# Patient Record
Sex: FEMALE | Race: WHITE | NOT HISPANIC OR LATINO
[De-identification: names, ages, dates, MRNs, and addresses within clinical notes are randomized per-mention and may not be internally consistent; named-entity substitution may affect disease eponyms.]

---

## 2021-10-01 ENCOUNTER — NON-APPOINTMENT (OUTPATIENT)
Age: 76
End: 2021-10-01

## 2021-10-01 ENCOUNTER — APPOINTMENT (OUTPATIENT)
Dept: OBGYN | Facility: CLINIC | Age: 76
End: 2021-10-01
Payer: MEDICARE

## 2021-10-01 VITALS
DIASTOLIC BLOOD PRESSURE: 90 MMHG | WEIGHT: 137 LBS | SYSTOLIC BLOOD PRESSURE: 160 MMHG | HEIGHT: 65 IN | BODY MASS INDEX: 22.82 KG/M2

## 2021-10-01 DIAGNOSIS — Z78.9 OTHER SPECIFIED HEALTH STATUS: ICD-10-CM

## 2021-10-01 DIAGNOSIS — Z87.42 PERSONAL HISTORY OF OTHER DISEASES OF THE FEMALE GENITAL TRACT: ICD-10-CM

## 2021-10-01 DIAGNOSIS — Z86.79 PERSONAL HISTORY OF OTHER DISEASES OF THE CIRCULATORY SYSTEM: ICD-10-CM

## 2021-10-01 DIAGNOSIS — Z86.018 PERSONAL HISTORY OF OTHER BENIGN NEOPLASM: ICD-10-CM

## 2021-10-01 DIAGNOSIS — Z85.828 PERSONAL HISTORY OF OTHER MALIGNANT NEOPLASM OF SKIN: ICD-10-CM

## 2021-10-01 DIAGNOSIS — Z80.42 FAMILY HISTORY OF MALIGNANT NEOPLASM OF PROSTATE: ICD-10-CM

## 2021-10-01 DIAGNOSIS — Z72.89 OTHER PROBLEMS RELATED TO LIFESTYLE: ICD-10-CM

## 2021-10-01 PROBLEM — Z00.00 ENCOUNTER FOR PREVENTIVE HEALTH EXAMINATION: Status: ACTIVE | Noted: 2021-10-01

## 2021-10-01 PROCEDURE — 99202 OFFICE O/P NEW SF 15 MIN: CPT

## 2021-10-07 ENCOUNTER — APPOINTMENT (OUTPATIENT)
Dept: OBGYN | Facility: CLINIC | Age: 76
End: 2021-10-07
Payer: MEDICARE

## 2021-10-07 VITALS — TEMPERATURE: 97.3 F

## 2021-10-07 VITALS
WEIGHT: 137 LBS | HEIGHT: 65 IN | BODY MASS INDEX: 22.82 KG/M2 | SYSTOLIC BLOOD PRESSURE: 140 MMHG | DIASTOLIC BLOOD PRESSURE: 84 MMHG

## 2021-10-07 LAB — URINE CYTOLOGY: NORMAL

## 2021-10-07 PROCEDURE — 99213 OFFICE O/P EST LOW 20 MIN: CPT

## 2021-10-14 ENCOUNTER — RESULT REVIEW (OUTPATIENT)
Age: 76
End: 2021-10-14

## 2021-10-14 ENCOUNTER — APPOINTMENT (OUTPATIENT)
Dept: UROLOGY | Facility: CLINIC | Age: 76
End: 2021-10-14
Payer: MEDICARE

## 2021-10-14 VITALS
WEIGHT: 137 LBS | SYSTOLIC BLOOD PRESSURE: 154 MMHG | HEIGHT: 65 IN | DIASTOLIC BLOOD PRESSURE: 77 MMHG | BODY MASS INDEX: 22.82 KG/M2 | TEMPERATURE: 97.5 F | HEART RATE: 92 BPM

## 2021-10-14 DIAGNOSIS — R31.0 GROSS HEMATURIA: ICD-10-CM

## 2021-10-14 DIAGNOSIS — R31.29 OTHER MICROSCOPIC HEMATURIA: ICD-10-CM

## 2021-10-14 PROCEDURE — 99205 OFFICE O/P NEW HI 60 MIN: CPT

## 2021-10-14 RX ORDER — ALPRAZOLAM 0.25 MG/1
0.25 TABLET ORAL
Qty: 1 | Refills: 0 | Status: DISCONTINUED | COMMUNITY
Start: 2021-10-01 | End: 2021-10-14

## 2021-10-14 NOTE — HISTORY OF PRESENT ILLNESS
[FreeTextEntry1] : Chief Complaint: + urine cytology / +++UA blood\par \par History of Present Illness:\par \par 76 female w/ PMH of HTN, fibroids, who presents with consultation for gross/microscopic hematuria. First discovered - Recently experienced a first week of PMB: postmenopausal bleeding. Currently worked up by Dr Lisa for PMB / has a leiomyoma on US pelvis.\par \par UA on 09/29/2021w/ Microscopic confirmation of LARGE blood\par Has seen bloody urine with the naked eye.\par Denies passage of clots.\par currently pad dependant.\par Associated symptoms include none, no dysuria.\par \par Endorses fully emptying bladder with urination, without history of retention.\par \par Past urologic history includes: \par Smoker for 10 years x 1 packs per day.\par No prior history to radiation/cyclophosphamide.\par No history of recurrent UTI or chronic catheterization\par No family history of  malignancies.\par Denies recent trauma to flanks or abdomen or urethral instrumentation.\par \par Denies personal and family history of kidney stones and is not experiencing intermittent sharp flank pain radiating to groin. Denies history of blood clots.\par Denies STD history.\par Does not take anticoagulants or Aspirin.\par Denies physical activity prior to associated hematuria.\par Pertinent review of systems is negative for: Lightheadedness, Dizziness, SOB, CP, Palpitations, Fever, Chills, sweats, weight loss - DOES endorse lower back pain.\par

## 2021-10-14 NOTE — REVIEW OF SYSTEMS
[Blood in urine that you can see] : blood visible in urine [Told you have blood in urine on a urine test] : told blood was present in a urine test [Wake up at night to urinate  How many times?  ___] : wakes up to urinate [unfilled] times during the night [Leakage of urine with straining, coughing, laughing] : leakage of urine with straining, coughing, laughing [Urine Infection (bladder/kidney)] : denies bladder/kidney infections [Pain during urination] : denies pain during urination [Pain at onset of urination] : denies pain during onset of urination [Pain after urination] : denies pain after urination [Discharge from urine canal] : denies discharge from urine canal [History of kidney stones] : denies history of kidney stones [Urine retention] : denies urine retention [Strong urge to urinate] : denies strong urge to urinate [Bladder pressure] : denies bladder pressure [Strain or push to urinate] : denies straining or pushing to urinate [Wait a long time to urinate] : denies waiting a long time to urinate [Slow urine stream] : denies slow urine stream [Interrupted urine stream] : denies interrupted urine stream [Bladder fullness after urinating] : denies bladder fullness after urinating [Increased pain/discomfort with bladder filling] : denies increased pain/discomfort with bladder filling [Bladder problems as child. If yes, describe..] : denies bladder problems as child [Unaware of when urine is leaking] : aware of when urine is leaking

## 2021-10-14 NOTE — PHYSICAL EXAM
[General Appearance - Well Developed] : well developed [General Appearance - Well Nourished] : well nourished [Heart Rate And Rhythm] : Heart rate and rhythm were normal [] : no respiratory distress [Bowel Sounds] : normal bowel sounds [Urinary Bladder Findings] : the bladder was normal on palpation [No Focal Deficits] : no focal deficits [Oriented To Time, Place, And Person] : oriented to person, place, and time [Not Anxious] : not anxious

## 2021-10-14 NOTE — ASSESSMENT
[FreeTextEntry1] : Assessment:\par \par 76 female with gross & microscopic hematuria \par UA from  ER visit demonstrates: large blood / urine cytology:suspicious for malignant cells\par Cannot exclude malignant etiology and thus requires complete hematuria workup with CT Urogram and Cystoscopy. Does have a history of smoking.\par Positive family history for  malignancy: prostate cancer in father\par \par Plan:\par Schedule patient for Cystoscopy + bladder biopsies + possible TUR + bilateral retrograde pyelograms\par CT urogram WWO to assess the upper urinary tract.\par \par 3) Labs\par \par BMP - Order placed for BMP to assess kidney function prior to contrast dye\par \par

## 2021-10-14 NOTE — LETTER BODY
[Dear  ___] : Dear  [unfilled], [Consult Letter:] : I had the pleasure of evaluating your patient, [unfilled]. [Please see my note below.] : Please see my note below. [Consult Closing:] : Thank you very much for allowing me to participate in the care of this patient.  If you have any questions, please do not hesitate to contact me. [Sincerely,] : Sincerely, [FreeTextEntry1] : I had the pleasure of seeing She in Urology clinic today. I have attached the relevant portions of our clinic note from the visit that documents the assessment and plan. I appreciate the opportunity to participate in her care. Should you have any questions, please do not hesitate to contact me.  \par \par Email: [email or web address] \par \par Mobile: [phone] \par  \par \par \par  [FreeTextEntry3] : Rafa Goldberg MD\par Urologic Oncology\par Robotic & Endoscopic urology\par [facility] [place]\par [facility]\par

## 2021-10-15 ENCOUNTER — NON-APPOINTMENT (OUTPATIENT)
Age: 76
End: 2021-10-15

## 2021-10-15 LAB — HPV HIGH+LOW RISK DNA PNL CVX: NOT DETECTED

## 2021-10-15 NOTE — DISCUSSION/SUMMARY
[FreeTextEntry1] : 75yo  PMF not on HRT is here for evaluation of PMB with 5cm anterior/fundal mass on recent sono concerning for malignancy.  Pt intolerant of exam today and in need of pap and endo bx therefore she was given Xanax to take prior to next visit so that exam can be performed. Pt is aware that she cannot drive herself to or from this visit since she will be rx'd this medicine.  \par \par Given SPP complaint and bleeding will also send urine cyto today to r/o bladder involvement, \par \par Pt to [facility] next week for exam, Pap and endo bx. \par \par All questions were solicited and answerd \par \par Ursula Pa MD \par Obstetrician/Gynecologist\par

## 2021-10-15 NOTE — PHYSICAL EXAM
[FreeTextEntry2] : extremely anxious  [Vulvar Atrophy] : vulvar atrophy [FreeTextEntry1] : Pt unable to tolerate even an external exam.

## 2021-10-15 NOTE — HISTORY OF PRESENT ILLNESS
[FreeTextEntry1] : 77yo  PMF not on HRT with 5cm anterior/fundal endometrial mass is here for follow up, results review, pap and endo bx for PMB. She did take the Xanax that was rx'd to her and is her with her  today.  SHe continues to have slight bleeding although it is somewhat less with the Provera that was rx'd. \par \par Her urine cyto that was sent resulted as suspicious for malignancy.

## 2021-10-15 NOTE — DISCUSSION/SUMMARY
[FreeTextEntry1] : 75yo  PMF not on HRT with anterior/fundal mass, cyto cells that are suspicious for malignancy and intolerance of exam. I spoke with her and her  about the plan to do an EUA, pap, D&C, hysteroscopy and myosure along with Dr Goldberg doing a cysto.  The R/B/A of the procedure were reviewed with patient in detail including but not limited to bleeding, infection incomplete/need for repeat/need for further procedure, injury to surrounding organs (bowel, bladder, uterus, tubes, ovaries, cervix), risk associated with anesthesia. She accepting of all blood products. Pt to obtain pre-operative surgical clearance from her PCP ASAP and to see Dr Goldberg. We discussed post op care, expectations  and precautions as well.\par \par All questions were solicited and answered. \par \par Ursula Pa MD \par Obstetrician/Gynecologist\par

## 2021-10-15 NOTE — HISTORY OF PRESENT ILLNESS
[FreeTextEntry1] : 75yo  PMF not on HRT is here for evaluation of PMB.  Pt had evaluation [facility] and sono and labs. Sono demonstrated calcified fibroids, EMS is 3.8 and read as prominent and hypervascular anterior fundal lesion measuring about 5 x 6 x 5cm and a second area of calcification measuring up to 2.2cm.  Her ovaries were not visualized. \par \par She also complaints of suprapubic pressure. Her urine testing at [facility] was neg. No cyto was sent. \par \par She has been having bleeding for about 1 month.  She has not had a GYN exam in several years. She denies any h/o abnl paps or STIs including HPV.  [Mammogramdate] : unsure [BreastSonogramDate] : unsure [PapSmeardate] : unsure  [ColonoscopyDate] : 3-4 yrs ago  [TextBox_43] : colonic polyps

## 2021-10-18 ENCOUNTER — APPOINTMENT (OUTPATIENT)
Dept: CT IMAGING | Facility: CLINIC | Age: 76
End: 2021-10-18
Payer: MEDICARE

## 2021-10-18 PROCEDURE — 74178 CT ABD&PLV WO CNTR FLWD CNTR: CPT | Mod: MH

## 2021-10-18 PROCEDURE — 82565 ASSAY OF CREATININE: CPT | Mod: QW

## 2021-10-22 ENCOUNTER — TRANSCRIPTION ENCOUNTER (OUTPATIENT)
Age: 76
End: 2021-10-22

## 2021-10-26 ENCOUNTER — OUTPATIENT (OUTPATIENT)
Dept: OUTPATIENT SERVICES | Facility: HOSPITAL | Age: 76
LOS: 1 days | End: 2021-10-26
Payer: MEDICARE

## 2021-10-26 PROCEDURE — 93010 ELECTROCARDIOGRAM REPORT: CPT

## 2021-10-27 LAB — CYTOLOGY CVX/VAG DOC THIN PREP: ABNORMAL

## 2021-10-29 ENCOUNTER — NON-APPOINTMENT (OUTPATIENT)
Age: 76
End: 2021-10-29

## 2021-11-04 ENCOUNTER — NON-APPOINTMENT (OUTPATIENT)
Age: 76
End: 2021-11-04

## 2021-11-05 ENCOUNTER — APPOINTMENT (OUTPATIENT)
Dept: DISASTER EMERGENCY | Facility: CLINIC | Age: 76
End: 2021-11-05

## 2021-11-05 DIAGNOSIS — Z01.818 ENCOUNTER FOR OTHER PREPROCEDURAL EXAMINATION: ICD-10-CM

## 2021-11-06 LAB — SARS-COV-2 N GENE NPH QL NAA+PROBE: NOT DETECTED

## 2021-11-08 ENCOUNTER — APPOINTMENT (OUTPATIENT)
Dept: OBGYN | Facility: HOSPITAL | Age: 76
End: 2021-11-08

## 2021-11-08 ENCOUNTER — APPOINTMENT (OUTPATIENT)
Dept: UROLOGY | Facility: HOSPITAL | Age: 76
End: 2021-11-08

## 2021-11-08 ENCOUNTER — OUTPATIENT (OUTPATIENT)
Dept: OUTPATIENT SERVICES | Facility: HOSPITAL | Age: 76
LOS: 1 days | End: 2021-11-08
Payer: MEDICARE

## 2021-11-08 PROCEDURE — 58561 HYSTEROSCOPY REMOVE MYOMA: CPT

## 2021-11-08 PROCEDURE — 52332 CYSTOSCOPY AND TREATMENT: CPT | Mod: LT,59

## 2021-11-08 PROCEDURE — 52351 CYSTOURETERO & OR PYELOSCOPE: CPT | Mod: LT

## 2021-11-11 ENCOUNTER — NON-APPOINTMENT (OUTPATIENT)
Age: 76
End: 2021-11-11

## 2021-11-11 RX ORDER — MEDROXYPROGESTERONE ACETATE 10 MG/1
10 TABLET ORAL DAILY
Qty: 14 | Refills: 1 | Status: ACTIVE | COMMUNITY
Start: 2021-10-01

## 2021-11-16 ENCOUNTER — NON-APPOINTMENT (OUTPATIENT)
Age: 76
End: 2021-11-16

## 2021-11-16 ENCOUNTER — APPOINTMENT (OUTPATIENT)
Dept: GYNECOLOGIC ONCOLOGY | Facility: CLINIC | Age: 76
End: 2021-11-16
Payer: MEDICARE

## 2021-11-16 VITALS
HEIGHT: 66 IN | RESPIRATION RATE: 16 BRPM | SYSTOLIC BLOOD PRESSURE: 157 MMHG | OXYGEN SATURATION: 97 % | HEART RATE: 102 BPM | BODY MASS INDEX: 20.25 KG/M2 | DIASTOLIC BLOOD PRESSURE: 94 MMHG | WEIGHT: 126 LBS

## 2021-11-16 DIAGNOSIS — N95.0 POSTMENOPAUSAL BLEEDING: ICD-10-CM

## 2021-11-16 PROCEDURE — 99205 OFFICE O/P NEW HI 60 MIN: CPT

## 2021-11-16 RX ORDER — IRON/IRON ASP GLY/FA/MV-MIN 38 125-25-1MG
TABLET ORAL
Refills: 0 | Status: ACTIVE | COMMUNITY

## 2021-11-16 NOTE — DISCUSSION/SUMMARY
[Reviewed Clinical Lab Test(s)] : Results of clinical tests were reviewed. [Reviewed Radiology Report(s)] : Radiology reports were reviewed. [Discuss Alternatives/Risks/Benefits w/Patient] : All alternatives, risks, and benefits were discussed with the patient/family and all questions were answered.  Patient expressed good understanding and appreciates the importance of follow up as recommended. [Pre Op] : The differential diagnosis was discussed in detail. The indications, risks, benefits and alternatives were discussed. [unfilled] expressed an understanding of the treatment rationale and her questions were answered to her apparent satisfaction.

## 2021-11-17 NOTE — PHYSICAL EXAM
[Chaperone Present] : A chaperone was present in the examining room during all aspects of the physical examination [Normal] : Skin and subcutaneous tissue: Normal without rashes or lesions [Abnormal] : Mood and affect: Abnormal [Anxious] : anxious [Tearful] : tearful [Fully active, able to carry on all pre-disease performance without restriction] : Status 0 - Fully active, able to carry on all pre-disease performance without restriction [FreeTextEntry1] : Patient was interviewed and examined with chaperone present. Name of chaperone: Shante Rhoades  [de-identified] : PT REFUSED TO HAVE PELVIC EXAM

## 2021-11-17 NOTE — ASSESSMENT
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 \par \par I had a lengthy discussion with the patient and her  and explained how her cancer is on the aggressive side. Recommendation is for surgery with RA TLH BSO SLNM, staging,omentectomy. Discussed the likely need for chemotherapy and radiation postoperatively. \par \par I discussed at length with the patient the nature, purpose, risks, benefits, and alternatives of Robot assisted total laparoscopic hysterectomy with bilateral salpingo-oophorectomy,sentinel lymph node mapping and retroperitoneal lymphadenectomy and staging (including possible omentectomy).  The patient understands the risks to include,but not be limited to, bowel injury, bleeding (with the possible need for transfusion), bladder or ureteral injury, infections, deep venous thrombosis, and jacy-operative death.  The patient understands the utility of intraoperative frozen section to determine whether surgical staging will be performed. The patient also understands that her surgery may not be able to be performed robotically and that she may need a laparotomy.  She also understands the limitations of robotic surgery and the possibility of missing a surgical complication with need for subsequent re-exploration.  She agrees to proceed.  She asked numerous questions which were answered to her satisfaction.  She understands the need for a pre-operative bowel preparation and agrees to comply with our instructions.  She also understands the rationale for a cystoscopy at the completion of the procedure and the potential risks of cystoscopy.  The patient also understands the possible limitations of frozen section and the limitations of robotic staging compared to a laparotomy approach.\par \par \par

## 2021-11-17 NOTE — CHIEF COMPLAINT
[Spouse] : spouse [FreeTextEntry1] : [facility]\par \par [facility] [phone] at [address] [place] [id number]\par

## 2021-11-17 NOTE — END OF VISIT
[FreeTextEntry3] : Written by Shante PENG, acting as a scribe for Dr. Jameel Goodwin.\par This note accurately reflects the work and decisions made by me.\par

## 2021-11-22 ENCOUNTER — NON-APPOINTMENT (OUTPATIENT)
Age: 76
End: 2021-11-22

## 2021-12-03 ENCOUNTER — OUTPATIENT (OUTPATIENT)
Dept: OUTPATIENT SERVICES | Facility: HOSPITAL | Age: 76
LOS: 1 days | End: 2021-12-03
Payer: MEDICARE

## 2021-12-03 ENCOUNTER — RESULT REVIEW (OUTPATIENT)
Age: 76
End: 2021-12-03

## 2021-12-03 DIAGNOSIS — R82.998 OTHER ABNORMAL FINDINGS IN URINE: ICD-10-CM

## 2021-12-03 DIAGNOSIS — C68.0 MALIGNANT NEOPLASM OF URETHRA: ICD-10-CM

## 2021-12-03 DIAGNOSIS — C54.1 MALIGNANT NEOPLASM OF ENDOMETRIUM: ICD-10-CM

## 2021-12-03 PROCEDURE — 88321 CONSLTJ&REPRT SLD PREP ELSWR: CPT

## 2021-12-06 LAB — SURGICAL PATHOLOGY STUDY: SIGNIFICANT CHANGE UP

## 2021-12-08 ENCOUNTER — FORM ENCOUNTER (OUTPATIENT)
Age: 76
End: 2021-12-08

## 2021-12-09 ENCOUNTER — RESULT REVIEW (OUTPATIENT)
Age: 76
End: 2021-12-09

## 2021-12-29 ENCOUNTER — APPOINTMENT (OUTPATIENT)
Dept: GYNECOLOGIC ONCOLOGY | Facility: CLINIC | Age: 76
End: 2021-12-29
Payer: MEDICARE

## 2021-12-29 DIAGNOSIS — C54.1 MALIGNANT NEOPLASM OF ENDOMETRIUM: ICD-10-CM

## 2021-12-29 PROCEDURE — 99024 POSTOP FOLLOW-UP VISIT: CPT

## 2021-12-29 NOTE — ASSESSMENT
[FreeTextEntry1] : Pt is a 75 yo with Stage IIIC grade 3 endometrial adenocarcinoma s/p TRH, BSO, staging on 12/9/21. She had right pelvic lymph nodes with metastatic disease including a macrometastatic focus densely adherent along the obturator lymph node which was resected. She will need adjuvant treatment with chemotherapy and radiation (sandwitch therapy). She is moving to [place] and will continue her treatment there. We will provide any information required to the medical provider in [place].\par \par We discussed importance of chemotherapy and benefit of radiation in her situation. Clips were placed in the left obturator space to tere the area of macrometastatic disease to allow for boost radiation. All questions were addressed.

## 2021-12-29 NOTE — REASON FOR VISIT
[Post Op] : post op visit [de-identified] : 12/9/21 [de-identified] : TRH, BSO, staging, and tumor debulking.   [de-identified] : PT presents for post-op check. She reports doing well overall and not needing any pain medications. The pain in right leg has improved. She has normal bowel habbits and PO intake. No nausea/vomiting, no fevers/chills.

## 2022-01-05 ENCOUNTER — FORM ENCOUNTER (OUTPATIENT)
Age: 77
End: 2022-01-05